# Patient Record
Sex: MALE | NOT HISPANIC OR LATINO | URBAN - METROPOLITAN AREA
[De-identification: names, ages, dates, MRNs, and addresses within clinical notes are randomized per-mention and may not be internally consistent; named-entity substitution may affect disease eponyms.]

---

## 2017-01-12 ENCOUNTER — OFFICE (OUTPATIENT)
Dept: URBAN - METROPOLITAN AREA CLINIC 14 | Facility: CLINIC | Age: 37
End: 2017-01-12

## 2017-01-12 VITALS
HEART RATE: 95 BPM | HEIGHT: 67 IN | SYSTOLIC BLOOD PRESSURE: 112 MMHG | DIASTOLIC BLOOD PRESSURE: 70 MMHG | WEIGHT: 173 LBS

## 2017-01-12 DIAGNOSIS — E66.3 OVERWEIGHT: ICD-10-CM

## 2017-01-12 DIAGNOSIS — E11.9 TYPE 2 DIABETES MELLITUS WITHOUT COMPLICATIONS: ICD-10-CM

## 2017-01-12 DIAGNOSIS — B96.81 HELICOBACTER PYLORI [H. PYLORI] AS THE CAUSE OF DISEASES CLA: ICD-10-CM

## 2017-01-12 DIAGNOSIS — R10.13 EPIGASTRIC PAIN: ICD-10-CM

## 2017-01-12 PROCEDURE — 99213 OFFICE O/P EST LOW 20 MIN: CPT | Performed by: INTERNAL MEDICINE

## 2017-07-13 ENCOUNTER — OFFICE (OUTPATIENT)
Dept: URBAN - METROPOLITAN AREA CLINIC 14 | Facility: CLINIC | Age: 37
End: 2017-07-13
Payer: COMMERCIAL

## 2017-07-13 VITALS
SYSTOLIC BLOOD PRESSURE: 115 MMHG | DIASTOLIC BLOOD PRESSURE: 73 MMHG | HEIGHT: 67 IN | WEIGHT: 169 LBS | HEART RATE: 77 BPM

## 2017-07-13 DIAGNOSIS — E11.9 TYPE 2 DIABETES MELLITUS WITHOUT COMPLICATIONS: ICD-10-CM

## 2017-07-13 DIAGNOSIS — E66.3 OVERWEIGHT: ICD-10-CM

## 2017-07-13 DIAGNOSIS — R10.13 EPIGASTRIC PAIN: ICD-10-CM

## 2017-07-13 PROCEDURE — 99213 OFFICE O/P EST LOW 20 MIN: CPT | Performed by: INTERNAL MEDICINE

## 2017-07-13 NOTE — SERVICEHPINOTES
interval historyMr. Jones was seen about six months ago for follow-up.  He was initially seen for abdominal pain and dyspepsia.  EGD was done that showed H pylori gastritis that was treated.  On today's visit patient reports that his symptoms are in very good control.  Denies any other new symptoms.  Patient reports that he is leaving the country and go back to [place] next few months.

## 2017-09-28 ENCOUNTER — OFFICE (OUTPATIENT)
Dept: URBAN - METROPOLITAN AREA CLINIC 14 | Facility: CLINIC | Age: 37
End: 2017-09-28
Payer: COMMERCIAL

## 2017-09-28 VITALS
HEART RATE: 77 BPM | HEIGHT: 67 IN | DIASTOLIC BLOOD PRESSURE: 80 MMHG | WEIGHT: 170 LBS | SYSTOLIC BLOOD PRESSURE: 120 MMHG

## 2017-09-28 DIAGNOSIS — K92.1 MELENA: ICD-10-CM

## 2017-09-28 DIAGNOSIS — E11.9 TYPE 2 DIABETES MELLITUS WITHOUT COMPLICATIONS: ICD-10-CM

## 2017-09-28 DIAGNOSIS — E66.3 OVERWEIGHT: ICD-10-CM

## 2017-09-28 PROCEDURE — 99213 OFFICE O/P EST LOW 20 MIN: CPT | Performed by: INTERNAL MEDICINE
